# Patient Record
Sex: MALE | Race: BLACK OR AFRICAN AMERICAN | NOT HISPANIC OR LATINO | Employment: STUDENT | URBAN - METROPOLITAN AREA
[De-identification: names, ages, dates, MRNs, and addresses within clinical notes are randomized per-mention and may not be internally consistent; named-entity substitution may affect disease eponyms.]

---
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
## 2019-03-11 ENCOUNTER — HOSPITAL ENCOUNTER (EMERGENCY)
Facility: HOSPITAL | Age: 19
Discharge: HOME | End: 2019-03-11
Attending: EMERGENCY MEDICINE

## 2019-03-11 VITALS
DIASTOLIC BLOOD PRESSURE: 82 MMHG | WEIGHT: 144.31 LBS | SYSTOLIC BLOOD PRESSURE: 124 MMHG | BODY MASS INDEX: 24.04 KG/M2 | RESPIRATION RATE: 20 BRPM | TEMPERATURE: 99 F | HEIGHT: 65 IN | HEART RATE: 67 BPM | OXYGEN SATURATION: 99 %

## 2019-03-11 DIAGNOSIS — R05.9 COUGH: ICD-10-CM

## 2019-03-11 DIAGNOSIS — B34.9 VIRAL ILLNESS: Primary | ICD-10-CM

## 2019-03-11 PROCEDURE — 99283 EMERGENCY DEPT VISIT LOW MDM: CPT | Mod: 25,ER

## 2019-03-11 RX ORDER — ONDANSETRON 4 MG/1
4 TABLET, ORALLY DISINTEGRATING ORAL EVERY 6 HOURS PRN
Qty: 12 TABLET | Refills: 0 | Status: SHIPPED | OUTPATIENT
Start: 2019-03-11

## 2019-03-11 NOTE — ED NOTES
Patient to ER with the report of not feeling well. He reports a decreased appetite, body aches, intermittent cough and  N/V/ and diarrhea. BBSCTA, skin warm and dry . Patient amb with steady gait. NAD noted will continue to monitor.

## 2019-03-11 NOTE — ED PROVIDER NOTES
Encounter Date: 3/11/2019       History     Chief Complaint   Patient presents with    Cough     Patient reports he has been having a cough  body aches, decreased appetite like the flu onset thursday night, + nausea, + vomiting and diarrhea.     Patient currently presents with concerns regarding flulike symptoms.  Symptoms include nasal congestion, fever, and myalgias.  Symptoms reportedly began last THU (4 days ago).  Patient reports vomiting and diarrhea over the weekend but none today.  There has been no relief with over-the-counter medications at home.  Patient reports recent ill contacts              Review of patient's allergies indicates:  No Known Allergies  Past Medical History:   Diagnosis Date    Asthma     History of self mutilation     Marijuana use     Suicide attempt      Past Surgical History:   Procedure Laterality Date    NO PAST SURGERIES       No family history on file.  Social History     Tobacco Use    Smoking status: Current Every Day Smoker     Packs/day: 0.50     Types: Cigarettes    Smokeless tobacco: Never Used   Substance Use Topics    Alcohol use: No    Drug use: No     Review of Systems   Constitutional: Positive for fatigue. Negative for chills and fever.   HENT: Negative for congestion.    Respiratory: Positive for cough. Negative for chest tightness and shortness of breath.    Cardiovascular: Negative for chest pain and leg swelling.   Gastrointestinal: Positive for diarrhea, nausea and vomiting. Negative for abdominal pain and constipation.   Genitourinary: Negative for dysuria, frequency and urgency.   Musculoskeletal: Positive for myalgias.   Skin: Negative for color change and rash.   Allergic/Immunologic: Negative for immunocompromised state.   Neurological: Negative for weakness and numbness.   Hematological: Negative for adenopathy. Does not bruise/bleed easily.   All other systems reviewed and are negative.    Physical Exam     Initial Vitals [03/11/19 0019]   BP  "Pulse Resp Temp SpO2   130/84 64 16 99.2 °F (37.3 °C) 98 %      MAP       --         Vitals:    03/11/19 0019 03/11/19 0124   BP: 130/84 124/82   Pulse: 64 67   Resp: 16 20   Temp: 99.2 °F (37.3 °C)    TempSrc: Oral    SpO2: 98% 99%   Weight: 65.5 kg (144 lb 4.7 oz)    Height: 5' 5" (1.651 m)          Physical Exam    Nursing note and vitals reviewed.  Constitutional: He appears well-developed and well-nourished. He is not diaphoretic. No distress.   HENT:   Head: Normocephalic and atraumatic.   Right Ear: External ear normal.   Left Ear: External ear normal.   Nose: Rhinorrhea present.   Mouth/Throat: Oropharynx is clear and moist.   Eyes: Conjunctivae and EOM are normal. Pupils are equal, round, and reactive to light. No scleral icterus.   Neck: Neck supple. No JVD present.   Cardiovascular: Normal rate, regular rhythm, normal heart sounds and intact distal pulses. Exam reveals no gallop and no friction rub.    No murmur heard.  Pulmonary/Chest: Breath sounds normal. No respiratory distress. He has no wheezes. He has no rhonchi. He has no rales.   Abdominal: Soft. Bowel sounds are normal. He exhibits no distension. There is no tenderness.   Musculoskeletal: Normal range of motion. He exhibits no edema.   Neurological: He is alert and oriented to person, place, and time.   Skin: Skin is warm and dry. No rash noted.   Psychiatric: He has a normal mood and affect. His behavior is normal.         ED Course   Procedures  Labs Reviewed - No data to display       Imaging Results          X-Ray Chest PA And Lateral (Final result)  Result time 03/11/19 07:34:04    Final result by JOHN Sung Sr., MD (03/11/19 07:34:04)                 Impression:      Normal study.      Electronically signed by: Dalton Sung MD  Date:    03/11/2019  Time:    07:34             Narrative:    EXAMINATION:  XR CHEST PA AND LATERAL    CLINICAL HISTORY:  Cough    COMPARISON:  03/16/2017    FINDINGS:  The size and contour of the heart are " normal. The lungs are clear. There is no pneumothorax or pleural effusion.                                 Medical Decision Making:   ED Management:  All findings were reviewed with the patient/family in detail along with the diagnosis of viral illness.  I see no indication of an emergent process beyond that addressed during our encounter but have duly counseled the patient/family regarding the need for prompt follow-up as well as the indications that should prompt immediate return to the emergency room should new or worrisome developments occur.  The patient/family communicates understanding of all this information and all remaining questions and concerns were addressed at this time.                          Clinical Impression:       ICD-10-CM ICD-9-CM   1. Viral illness B34.9 079.99   2. Cough R05 786.2                                Yevgeniy Noland MD  03/14/19 2010

## 2023-04-28 NOTE — ED AVS SNAPSHOT
[facility]  [address] [address] [address]               Antony Dixon   3/16/2017  3:55 PM   ED    Description:  Male : 2000   Department:  [facility]           Your Care was Coordinated By:     Provider Role From To    Ochoa Vasquez PA-C Physician Assistant 17 1690 --      Reason for Visit     Fever           Diagnoses this Visit        Comments    Viral syndrome    -  Primary     Fever         Flu-like symptoms         Myalgia           ED Disposition     ED Disposition Condition Comment    Discharge             To Do List           Follow-up Information     Follow up with Scarlett Peraza MD. Schedule an appointment as soon as possible for a visit in 2 days.    Specialty:  Pediatrics    Why:  Follow up with your pediatrician in the next 1-2 days for re-evaluation and further management. Take Tylenol and Motrin as directed as needed for fever and aches. Drink plenty of fluids.     Contact information:    [address]  [address]  [facility]  [facility] [id number] [phone]          Follow up with [facility].    Specialty:  Emergency Medicine    Why:  If symptoms worsen in any way.     Contact information:    [address] [address] [address] [phone]      [facility]     [facility] -  Assistance  Registered nurses in the [facility] provide clinical advisement, health education, appointment booking, and other advisory services.  Call for this free service at [phone].             Medications           Message regarding Medications     Verify the changes and/or additions to your medication regime listed below are the same as discussed with your clinician today.  If any of these changes or additions are incorrect, please notify your healthcare provider.        These medications were administered today        Dose Freq    acetaminophen  "Progress Note - OB/GYN  Shannon Morales 31 y o  female MRN: [id number]  Unit/Bed#:  306-01 Encounter: [id number]    Assessment and Plan     Shannon Morales is a patient of: [facility]   She is PPD# 2 s/p  spontaneous vaginal delivery  Recovering well and is stable     *  (spontaneous vaginal delivery)  Assessment & Plan  Lochia WNL   Recovering well   Appropriate bowel and bladder function   Pain well controlled   Tolerating diet   Breastfeeding   Ambulating without issues   No lower extremity tenderness  GBS neg  Rh pos       Disposition    - Anticipate discharge home on PPD# 2      Subjective/Objective     Chief Complaint: Postpartum State     Subjective:    Shannon Morales is PPD/POD#2 s/p  spontaneous vaginal delivery  She has no current complaints  Pain is well controlled  Patient is currently voiding  She is ambulating  Patient is currently passing flatus and has had no bowel movement  She is tolerating PO, and denies nausea or vomitting  Patient denies fever, chills, chest pain, shortness of breath, or calf tenderness  Lochia is normal  She is  Breastfeeding  She is recovering well and is stable       Vitals:   /71 (BP Location: Right arm)   Pulse 67   Temp 97 7 °F (36 5 °C) (Oral)   Resp 18   Ht 5' 3\" (1 6 m)   Wt 84 8 kg (187 lb)   LMP 2022   SpO2 97%   Breastfeeding Yes   BMI 33 13 kg/m²     No intake or output data in the 24 hours ending 23 0648    Invasive Devices     None                 Physical Exam:   GEN: Shannon Morales appears well, alert and oriented x 3, pleasant and cooperative   CARDIO: RRR, no murmurs or rubs  RESP:  CTAB, no wheezes or rales  ABDOMEN: soft, no tenderness, no distention, fundus @ U, Incision C/D/I  EXTREMITIES: SCDs on, non tender, no erythema, b/l Jigna's sign negative    Labs:     Hemoglobin   Date Value Ref Range Status   2023 11 3 (L) 11 5 - 15 4 g/dL Final   2023 11 0 (L) 11 5 - 15 4 g/dL Final   2016 13 0 11 5 - 15 4 " g/dL Final     WBC   Date Value Ref Range Status   04/26/2023 11 56 (H) 4 31 - 10 16 Thousand/uL Final   04/11/2023 10 41 (H) 4 31 - 10 16 Thousand/uL Final   01/06/2016 9 49 4 31 - 10 16 Thousand/uL Final     Platelets   Date Value Ref Range Status   04/26/2023 227 149 - 390 Thousands/uL Final   04/11/2023 233 149 - 390 Thousands/uL Final   01/06/2016 307 149 - 390 Thousand/uL Final     Creatinine   Date Value Ref Range Status   04/11/2023 0 36 (L) 0 60 - 1 30 mg/dL Final     Comment:     Standardized to IDMS reference method   12/26/2022 0 40 (L) 0 60 - 1 30 mg/dL Final     Comment:     Standardized to IDMS reference method   01/06/2016 0 64 0 60 - 1 30 mg/dL Final     Comment:     Standardized to IDMS reference method     AST   Date Value Ref Range Status   04/11/2023 10 (L) 13 - 39 U/L Final   12/26/2022 14 13 - 39 U/L Final     Comment:     Specimen collection should occur prior to Sulfasalazine administration due to the potential for falsely depressed results  01/06/2016 11 5 - 45 U/L Final     ALT   Date Value Ref Range Status   04/11/2023 7 7 - 52 U/L Final     Comment:     Specimen collection should occur prior to Sulfasalazine administration due to the potential for falsely depressed results  12/26/2022 9 7 - 52 U/L Final     Comment:     Specimen collection should occur prior to Sulfasalazine administration due to the potential for falsely depressed results      01/06/2016 21 12 - 78 U/L Final          Elida Roy MD  4/28/2023  6:48 AM tablet 650 mg 650 mg ED 1 Time    Sig: Take 2 tablets (650 mg total) by mouth ED 1 Time.    Class: Normal    Route: Oral    Cosign for Ordering: Required by Mary Chery MD           Verify that the below list of medications is an accurate representation of the medications you are currently taking.  If none reported, the list may be blank. If incorrect, please contact your healthcare provider. Carry this list with you in case of emergency.           Current Medications     ALBUTEROL SULFATE (VENTOLIN HFA INHL) Inhale into the lungs.           Clinical Reference Information           Your Vitals Were     BP Pulse Temp Resp Weight SpO2    123/72 (BP Location: Left arm, Patient Position: Sitting) 111 98.8 °F (37.1 °C) (Oral) 17 60.3 kg (133 lb) 99%      Allergies as of 3/16/2017     No Known Allergies      Immunizations Administered on Date of Encounter - 3/16/2017     None      ED Micro, Lab, POCT     Start Ordered       Status Ordering Provider    03/16/17 1605 03/16/17 1605  Influenza antigen Nasal Swab  STAT      Final result       ED Imaging Orders     Start Ordered       Status Ordering Provider    03/16/17 1605 03/16/17 1605  X-Ray Chest PA And Lateral  1 time imaging      Final result       Discharge References/Attachments     FEVER CONTROL (ADULT) (ENGLISH)    MYALGIAS (ENGLISH)    VIRAL SYNDROME (ADULT) (ENGLISH)    COLDS, ADULT SELF-CARE FOR (ENGLISH)       [facility] complies with applicable Federal civil rights laws and does not discriminate on the basis of race, color, national origin, age, disability, or sex.        Language Assistance Services     ATTENTION: Language assistance services are available, free of charge. Please call [phone].      ATENCIÓN: Si habla español, tiene a myers disposición servicios gratuitos de asistencia lingüística. Llame al [phone].     CHÚ Ý: N?u b?n nói Ti?ng Vi?t, có các d?ch v? h? tr? ngôn ng? mi?n phí dành cho b?n. G?i s?  [phone].